# Patient Record
(demographics unavailable — no encounter records)

---

## 2024-10-28 NOTE — REVIEW OF SYSTEMS
VM left advising patient to check portal for test result, call [phone] with questions or concerns [see HPI] : see HPI [Negative] : Heme/Lymph

## 2024-10-31 NOTE — PHYSICAL EXAM
[General Appearance - Well Developed] : well developed [General Appearance - Well Nourished] : well nourished [Normal Appearance] : normal appearance [Well Groomed] : well groomed [General Appearance - In No Acute Distress] : no acute distress [Edema] : no peripheral edema [Respiration, Rhythm And Depth] : normal respiratory rhythm and effort [Exaggerated Use Of Accessory Muscles For Inspiration] : no accessory muscle use [Abdomen Soft] : soft [Abdomen Tenderness] : non-tender [Costovertebral Angle Tenderness] : no ~M costovertebral angle tenderness [Urinary Bladder Findings] : the bladder was normal on palpation [] : no rash [No Focal Deficits] : no focal deficits [Oriented To Time, Place, And Person] : oriented to person, place, and time [Affect] : the affect was normal [Mood] : the mood was normal [Not Anxious] : not anxious [de-identified] : Ambulating in wheelchair

## 2024-10-31 NOTE — ASSESSMENT
[FreeTextEntry1] : We will switch her to Cipro 250 twice daily based on culture.  When the final culture results if she needs to be changed again, we will change her, if she is still symptomatic.  She will obtain repeat urine culture in 2 weeks and we will meet over the phone to see if her symptoms improved.  ER precautions thoroughly reviewed with her and the family.

## 2024-10-31 NOTE — LETTER BODY
[Dear  ___] : Dear  [unfilled], [Courtesy Letter:] : I had the pleasure of seeing your patient, [unfilled], in my office today. [Please see my note below.] : Please see my note below. [Sincerely,] : Sincerely, [FreeTextEntry2] : Kishan Castellanos, \par  [address]\[place] [address]\par   [DrNilesh  ___] : Dr. WATERS

## 2024-10-31 NOTE — LETTER BODY
[Dear  ___] : Dear  [unfilled], [Courtesy Letter:] : I had the pleasure of seeing your patient, [unfilled], in my office today. [Please see my note below.] : Please see my note below. [Sincerely,] : Sincerely, [FreeTextEntry2] : Kishan Castellanos, \par  [address]\[place] [address]\par   [DrNilesh  ___] : Dr. WATERS

## 2024-10-31 NOTE — PHYSICAL EXAM
[General Appearance - Well Developed] : well developed [General Appearance - Well Nourished] : well nourished [Normal Appearance] : normal appearance [Well Groomed] : well groomed [General Appearance - In No Acute Distress] : no acute distress [Edema] : no peripheral edema [Respiration, Rhythm And Depth] : normal respiratory rhythm and effort [Exaggerated Use Of Accessory Muscles For Inspiration] : no accessory muscle use [Abdomen Soft] : soft [Abdomen Tenderness] : non-tender [Costovertebral Angle Tenderness] : no ~M costovertebral angle tenderness [Urinary Bladder Findings] : the bladder was normal on palpation [] : no rash [No Focal Deficits] : no focal deficits [Oriented To Time, Place, And Person] : oriented to person, place, and time [Affect] : the affect was normal [Mood] : the mood was normal [Not Anxious] : not anxious [de-identified] : Ambulating in wheelchair

## 2024-10-31 NOTE — LETTER HEADER
[FreeTextEntry3] : Era Hurtado MD [facility], [address] [place] [address]

## 2024-10-31 NOTE — PHYSICAL EXAM
[General Appearance - Well Developed] : well developed [General Appearance - Well Nourished] : well nourished [Normal Appearance] : normal appearance [Well Groomed] : well groomed [General Appearance - In No Acute Distress] : no acute distress [Edema] : no peripheral edema [Respiration, Rhythm And Depth] : normal respiratory rhythm and effort [Exaggerated Use Of Accessory Muscles For Inspiration] : no accessory muscle use [Abdomen Soft] : soft [Abdomen Tenderness] : non-tender [Costovertebral Angle Tenderness] : no ~M costovertebral angle tenderness [Urinary Bladder Findings] : the bladder was normal on palpation [] : no rash [No Focal Deficits] : no focal deficits [Oriented To Time, Place, And Person] : oriented to person, place, and time [Affect] : the affect was normal [Mood] : the mood was normal [Not Anxious] : not anxious [de-identified] : Ambulating in wheelchair

## 2024-10-31 NOTE — LETTER BODY
[Dear  ___] : Dear  [unfilled], [Courtesy Letter:] : I had the pleasure of seeing your patient, [unfilled], in my office today. [Please see my note below.] : Please see my note below. [Sincerely,] : Sincerely, [FreeTextEntry2] : Kishan Castellanos, \par  [address]\[place] [address]\par   [DrNilesh  ___] : Dr. WATERS

## 2024-10-31 NOTE — LETTER HEADER
[FreeTextEntry3] : Era Hurtado MD [facility]1 [facility], [address] [place] [address]

## 2024-10-31 NOTE — HISTORY OF PRESENT ILLNESS
[FreeTextEntry1] : Alcohol is a 97-year-old female we have been following frequently bladder emptying with chronic UTI.  She was under suppression with 1 capsule Macrobid daily.  In early October she suffered a fall and wound up hospitalized.  She wound up having an ERCP for gallstones.  During that hospitalization she developed a urinary tract infection was treated with Zosyn.  At discharge, she was having worsening symptoms and contacted the office.  Urinalysis with culture/sensitivity were pending however, she was placed on cefuroxime empirically based on her prior cultures being consistently E. coli.  Culture specifics are still pending however, now shows Pseudomonas and Klebsiella.  Her urine is still cloudy and she still symptomatic.  Preliminary results demonstrate that the Pseudomonas is sensitive to Cipro.   [Urinary Urgency] : urinary urgency [Urinary Frequency] : urinary frequency [Dysuria] : dysuria

## 2024-10-31 NOTE — LETTER HEADER
[FreeTextEntry3] : Era Hurtaod MD [facility]1 [facility], [address] [place] [address]

## 2024-11-04 NOTE — HISTORY OF PRESENT ILLNESS
[de-identified] : 97-year-old female comes in today with granddaughter and daughter for an evaluation of her right shoulder pain and injury that occurred on September 30.  Patient had a fall at home.  Patient went to [facility] diagnosed with humerus fracture, treated conservatively in a sling.  As per ED records patient was consulted by Dr. Gong

## 2024-11-04 NOTE — ASSESSMENT
[FreeTextEntry1] : 5 weeks status post conservative management proximal humerus fracture.  Patient can discontinue the sling.  Therapy prescription provided, family is going to see if they can get her in-home services.  Follow-up with Dr. Gong in a few weeks for repeat evaluation

## 2024-11-04 NOTE — IMAGING
[de-identified] : On examination of the right shoulder resolving ecchymosis from the mid proximal humerus down towards the elbow and forearm.  Granddaughter states that the swelling has significantly improved.  Shoulder passive motion to 90 degrees  Right elbow restriction through passive motion of the elbow.  X-ray obtained in the office today right shoulder 3 views mildly displaced proximal humerus fracture with callus formation

## 2024-11-14 NOTE — ASSESSMENT
[FreeTextEntry1] : After a lengthy discussion regarding all the options, including starting her on antibiotics and having her perform a trial of void, they are electing for continued catheterization for now.  Her mental condition has worsened a bit and while they are concerned with her pulling out the catheter they are also concerned of her behavior, which they believe was worse, when she did not have a Johnson in.  Their belief is that bladder irritation and the incomplete emptying and the constant need to urinate made her very combative.  We also discussed taking out the catheter teaching them to catheterize her once a day and irrigating out the bladder so she will not have an indwelling catheter to pull up and we would decrease the colony count with irrigation.  They do not think she will tolerate that.  We had a back-and-forth give-and-take discussion and the end result was She appears more comfortable with the catheter and for now was not fighting The catheter needs to be changed every 4 weeks so we will give her an appointment for 3 weeks from now. If she starts becoming agitated about the catheter we can start her on antibiotics to take out the catheter a few days later and see if we can get her back to her chronic states she was in before with incomplete emptying but the bacteriuria controlled by the Macrobid.  If she is not taking out the catheter she should not get suppressive antibiotics  For now, she will follow-up in 4 weeks for catheter change and we will revisit this issue at that time.

## 2024-11-14 NOTE — PHYSICAL EXAM
[General Appearance - Well Developed] : well developed [General Appearance - Well Nourished] : well nourished [Normal Appearance] : normal appearance [Well Groomed] : well groomed [General Appearance - In No Acute Distress] : no acute distress [Edema] : no peripheral edema [Respiration, Rhythm And Depth] : normal respiratory rhythm and effort [Exaggerated Use Of Accessory Muscles For Inspiration] : no accessory muscle use [Abdomen Soft] : soft [Abdomen Tenderness] : non-tender [Costovertebral Angle Tenderness] : no ~M costovertebral angle tenderness [] : no rash [No Focal Deficits] : no focal deficits [Oriented To Time, Place, And Person] : oriented to person, place, and time [Affect] : the affect was normal [Mood] : the mood was normal [Not Anxious] : not anxious [Chaperone Present] : A chaperone was present in the examining room during all aspects of the physical examination [de-identified] : Catheter in place draining clear urine [de-identified] : She has significant difficulty walking by herself and they escorted her in a wheelchair. [FreeTextEntry2] : Alirio Khan PA-C

## 2024-11-14 NOTE — HISTORY OF PRESENT ILLNESS
[Urinary Urgency] : urinary urgency [Urinary Frequency] : urinary frequency [Dysuria] : dysuria [FreeTextEntry1] : Janet is a 97-year-old female, born 12/28/1926 last seen in October 2024, who we have been following for poor bladder emptying with chronic UTI.  She was well-managed under suppression with Macrobid daily however, she was hospitalized in October and during hospitalization contracted E. coli UTI.  Upon discharge, her family contact the office stating that she was experiencing dysuria with frequency/urgency.  Urine culture demonstrated multidrug-resistant Klebsiella and Pseudomonas.  She was started on Cipro and cefpodoxime and improved clinically however, daughter contacted the office stating that her symptoms were returning and her urine was cloudy shortly after finishing course of antibiotics.  After thorough discussion of the options they elected for Johnson catheterization as she has significant poor emptying.  Catheter was placed and urine culture from the catheter demonstrated multidrug-resistant Klebsiella.  With catheter in place she is doing well without any issues.  They deny fever, chills, abdominal/flank pain, suprapubic discomfort, or other constitutional symptoms.

## 2024-11-14 NOTE — LETTER HEADER
[FreeTextEntry3] : Era Hurtado MD [facility]1 [facility], [address] [place] [address]

## 2024-11-14 NOTE — LETTER BODY
[Dear  ___] : Dear  [unfilled], [Courtesy Letter:] : I had the pleasure of seeing your patient, [unfilled], in my office today. [Please see my note below.] : Please see my note below. [Sincerely,] : Sincerely, [DrNilesh  ___] : Dr. WATERS [FreeTextEntry2] : Kishan Castellanos, \par  [address]\[place] [address]\par